# Patient Record
Sex: MALE | Race: OTHER | URBAN - METROPOLITAN AREA
[De-identification: names, ages, dates, MRNs, and addresses within clinical notes are randomized per-mention and may not be internally consistent; named-entity substitution may affect disease eponyms.]

---

## 2018-06-28 ENCOUNTER — HOSPITAL ENCOUNTER (OUTPATIENT)
Dept: LAB | Age: 43
Discharge: HOME | End: 2018-06-28

## 2018-06-28 ENCOUNTER — OFFICE VISIT (OUTPATIENT)
Dept: FAMILY MEDICINE CLINIC | Age: 43
End: 2018-06-28

## 2018-06-28 VITALS
BODY MASS INDEX: 41.82 KG/M2 | HEART RATE: 58 BPM | DIASTOLIC BLOOD PRESSURE: 68 MMHG | WEIGHT: 236 LBS | TEMPERATURE: 97.7 F | SYSTOLIC BLOOD PRESSURE: 106 MMHG | HEIGHT: 63 IN

## 2018-06-28 DIAGNOSIS — G56.03 CARPAL TUNNEL SYNDROME, BILATERAL: ICD-10-CM

## 2018-06-28 DIAGNOSIS — Z80.42 FAMILY HISTORY OF MALIGNANT NEOPLASM OF PROSTATE IN FATHER: ICD-10-CM

## 2018-06-28 DIAGNOSIS — E66.01 CLASS 3 SEVERE OBESITY DUE TO EXCESS CALORIES WITH SERIOUS COMORBIDITY AND BODY MASS INDEX (BMI) OF 40.0 TO 44.9 IN ADULT (HCC): ICD-10-CM

## 2018-06-28 DIAGNOSIS — N28.1 RENAL CYST: ICD-10-CM

## 2018-06-28 DIAGNOSIS — E66.01 CLASS 3 SEVERE OBESITY DUE TO EXCESS CALORIES WITH SERIOUS COMORBIDITY AND BODY MASS INDEX (BMI) OF 40.0 TO 44.9 IN ADULT (HCC): Primary | ICD-10-CM

## 2018-06-28 LAB
ALBUMIN SERPL-MCNC: 4.1 G/DL (ref 3.5–5)
ALBUMIN/GLOB SERPL: 1.3 {RATIO} (ref 1.1–2.2)
ALP SERPL-CCNC: 106 U/L (ref 45–117)
ALT SERPL-CCNC: 43 U/L (ref 12–78)
ANION GAP SERPL CALC-SCNC: 7 MMOL/L (ref 5–15)
AST SERPL-CCNC: 22 U/L (ref 15–37)
BILIRUB SERPL-MCNC: 0.4 MG/DL (ref 0.2–1)
BUN SERPL-MCNC: 10 MG/DL (ref 6–20)
BUN/CREAT SERPL: 12 (ref 12–20)
CALCIUM SERPL-MCNC: 9 MG/DL (ref 8.5–10.1)
CHLORIDE SERPL-SCNC: 106 MMOL/L (ref 97–108)
CHOLEST SERPL-MCNC: 234 MG/DL
CO2 SERPL-SCNC: 28 MMOL/L (ref 21–32)
CREAT SERPL-MCNC: 0.83 MG/DL (ref 0.7–1.3)
EST. AVERAGE GLUCOSE BLD GHB EST-MCNC: 117 MG/DL
GLOBULIN SER CALC-MCNC: 3.2 G/DL (ref 2–4)
GLUCOSE SERPL-MCNC: 89 MG/DL (ref 65–100)
HBA1C MFR BLD: 5.7 % (ref 4.2–6.3)
HDLC SERPL-MCNC: 38 MG/DL
HDLC SERPL: 6.2 {RATIO} (ref 0–5)
LDLC SERPL CALC-MCNC: 159 MG/DL (ref 0–100)
LIPID PROFILE,FLP: ABNORMAL
POTASSIUM SERPL-SCNC: 4.3 MMOL/L (ref 3.5–5.1)
PROT SERPL-MCNC: 7.3 G/DL (ref 6.4–8.2)
SODIUM SERPL-SCNC: 141 MMOL/L (ref 136–145)
TRIGL SERPL-MCNC: 185 MG/DL (ref ?–150)
VLDLC SERPL CALC-MCNC: 37 MG/DL

## 2018-06-28 PROCEDURE — 80053 COMPREHEN METABOLIC PANEL: CPT | Performed by: PHYSICIAN ASSISTANT

## 2018-06-28 PROCEDURE — 83036 HEMOGLOBIN GLYCOSYLATED A1C: CPT | Performed by: PHYSICIAN ASSISTANT

## 2018-06-28 PROCEDURE — 84153 ASSAY OF PSA TOTAL: CPT | Performed by: PHYSICIAN ASSISTANT

## 2018-06-28 PROCEDURE — 80061 LIPID PANEL: CPT | Performed by: PHYSICIAN ASSISTANT

## 2018-06-28 RX ORDER — NAPROXEN 500 MG/1
500 TABLET ORAL 2 TIMES DAILY WITH MEALS
Qty: 60 TAB | Refills: 0 | Status: SHIPPED | OUTPATIENT
Start: 2018-06-28

## 2018-06-28 NOTE — PROGRESS NOTES
Chief Complaint   Patient presents with    Breathing Problem     states breathing is sometimes to fast    Eye Problem

## 2018-06-28 NOTE — PATIENT INSTRUCTIONS
Síndrome del gilberto ramirez: Instrucciones de cuidado - [ Carpal Tunnel Syndrome: Care Instructions ]  Instrucciones de cuidado    El síndrome del gilberto ramirez es un problema nervioso. Puede causar hormigueo, entumecimiento, debilidad o [employer] dedos, el pulgar y la [place]. El nervio mediano y varios tejidos fibrosos, llamados tendones, atraviesan la mariah por un espacio denominado gilberto ramirez. El movimiento repetido de las krystyna al trabajar o practicar ciertos pasatiempos y deportes puede hacer presión sobre el nervio. El embarazo y ciertas afecciones médicas, terrell la diabetes, la artritis y Analy Lady tiroides hipoactiva, también pueden causar el síndrome del gilberto ramirez. Para reducir los síntomas, usted podría limitar [facility] [place] o [facility]. También puede matt otras medidas para sentirse mejor. Si los síntomas son leves, es probable que pueda aliviar el dolor con 1 o 2 semanas de tratamiento en el [facility]. La cirugía es necesaria solo si otros tratamientos no funcionan. La atención de seguimiento es diony parte clave de amezquita tratamiento y seguridad. Asegúrese de hacer y acudir a todas las citas, y llame a amezquita médico si está teniendo problemas. También es diony buena idea saber los resultados de los exámenes y mantener diony lista de los medicamentos que elzbieta. ¿Cómo puede cuidarse en el hogar? · Si es posible, interrumpa o disminuya la actividad que causa los síntomas. Si no puede suspenderla, valente pausas frecuentes para descansar y estirarse o cambie la posición de las krystyna para hacer diony tarea. Trate de [place] [facility], terrell cuando [place] el ratón de diony computadora. · Trate de evitar doblar o rotar las muñecas. · Pregúntele a amezquita médico si puede matt un analgésico (medicamento para el dolor) de venta radha, terrell acetaminofén (Tylenol), ibuprofeno (Advil, Motrin) o naproxeno (Aleve). Sea magalis con los medicamentos. Jenny y siga todas las instrucciones de la [place].   · Si amezquita médico le receta corticosteroides para ayudar a aliviar el dolor y reducir la hinchazón, tómelos exactamente terrell le fueron recetados. Llame a amezquita médico si oziel estar teniendo problemas con amezquita medicamento. · Colóquese hielo o diony compresa fría sobre la [place] de 10 a 20 minutos cada vez para aliviar el dolor. Póngase un paño pak entre el hielo y la piel. · Si amezquita médico o amezquita fisioterapeuta o terapeuta ocupacional le indica que use diony tablilla (férula) para la [place], [place] según las indicaciones para mantener la [place] en diony posición neutra. [place] también reduce la presión sobre amezquita nervio mediano. · Pregúntele a amezquita médico si debería hacer sesiones de fisioterapia o de terapia ocupacional para aprender a hacer las tareas de [employer]. · [place] clases de yoga para estirar los músculos y fortalecer las krystyna y las Gabrielle. El yoga ha [employer] síntomas del túnel james. Cómo prevenir el síndrome del túnel chloeiano  · Cuando trabaje en la computadora, Ritu Gilliam [address] y las muñecas alineadas con los antebrazos. Mantenga los codos cerca de erich costados. [place] un descanso con diony frecuencia de 10 a 15 minutos. · Trate de hacer los siguientes ejercicios:  ¨ Calentamiento: Gire la mariah hacia arrgenny, Jacoby Olivas y de un lado a otro. Repita esto 4 veces. Estire [employer] dedos, relájelos y vuelva a estirarlos. Repita 4 veces. Estire el pulgar doblándolo levemente hacia atrás, manténgalo en micah posición y relájelo. Repita 4 veces. ¨ Estiramiento con los [employer] en posición de orar: Comience colocando las daniel de las krystyna juntas susanna del pecho, elio debajo del [place]. Baje las krystyna lentamente hacia la línea de la cintura y manténgalas cerca del estómago con las daniel juntas hasta que sienta un estiramiento leve a moderado debajo de los antebrazos. Mantenga la posición entre 10 y 21 segundos. Repita 4 veces.   ¨ Estiramiento del flexor de la mariah: Extienda el Sandeep Alejandro frente a usbritt, con la antonio Lobato Morales Mihir. Doble la mariah y apunte con la mano hacia el piso. Con la [employer], doble con suavidad la mariah aún más hasta que sienta un estiramiento entre leve y moderado en el antebrazo. Mantenga la posición entre 10 y 21 segundos. Repita 4 veces. ¨ Estiramiento del extensor de la mariah: Repita los pasos para el estiramiento del flexor de la mariah supa comience con la alvarez extendida [place]. · Apriete diony pelota de goma varias veces al día para mantener rahel las krystyna y los dedos. · Evite sostener objetos (terrell un libro) en la misma posición carmelo mucho tiempo. Siempre que sea posible, utilice toda la mano para matt un objeto. Si [place] solo el pulgar y el dedo índice puede tensionar la [place]. · No fume. Puede empeorar esta afección ya que reduce el flujo de david hacia el nervio El paso. Si necesita ayuda para dejar de fumar, hable con amezquita médico sobre programas y medicamentos para dejar de fumar. Estos pueden aumentar erich probabilidades de dejar el hábito para siempre. ¿Cuándo debe pedir ayuda? Preste especial atención a los cambios de amezquita odalis y asegúrese de comunicarse con amezquita médico si:  ? · El dolor u otros problemas no mejoran con los cuidados en el hogar. ? · Desea más información sobre la fisioterapia o la terapia ocupacional.   ? · Tiene efectos secundarios producidos por los corticosteroides, tales terrell:  ¨ Aumento de [place]. ¨ Cambios en el estado de ánimo. ¨ Problemas para dormir. ¨ Fácil formación de moretones. ? · Tiene otros problemas con los medicamentos. ¿Dónde puede encontrar más información en inglés? Salma Godinez a http://niecy-london.info/. Guille Looney [id number] en la búsqueda para aprender más acerca de \"Síndrome del túnel carpiano: Instrucciones de cuidado - [ Carpal Tunnel Syndrome: Care Instructions ]. \"  Revisado: 21 marzo, 2017  Versión del contenido: 11.4  © 7174-4884 Healthwise, Incorporated.  Las instrucciones de cuidado fueron adaptadas bajo licencia por [employer] (which disclaims liability or warranty for this information). Si usted tiene Colbert Boling afección médica o sobre estas instrucciones, siempre pregunte a degroot profesional de odalis. [facility] [employer] niega toda garantía o responsabilidad por degroot uso de esta información. Síndrome del gilberto ramirez: Ejercicios - [ Carpal Tunnel Syndrome: Exercises ]  Instrucciones de cuidado  Aquí se presentan algunos ejemplos de ejercicios típicos de rehabilitación para tratar degroot afección. Empiece cada ejercicio lentamente. Reduzca la intensidad del ejercicio si Tiffani Carrier a tener dolor. Degroot médico o degroot fisioterapeuta o terapeuta ocupacional le dirá cuándo puede comenzar con estos ejercicios y cuáles funcionarán mejor para usted. Estiramientos de calentamiento  Nota: Cuando ya no tenga dolor o entumecimiento, puede hacer ejercicios para ayudar a prevenir que vuelva el síndrome del gilberto ramirez. No valente ningún estiramiento o movimiento que sea incómodo o doloroso. Estiramientos de calentamiento  · Gire la mariah hacia arriba, [place] y de un lado a otro. Repita 4 veces. · Estire los dedos separándolos. Relájelos y luego vuelva a estirarlos. Repita 4 veces. · Estire el pulgar doblándolo levemente hacia atrás, manténgalo en micah posición y relájelo. Repita 4 veces. Cómo hacer los ejercicios  Estiramiento con los brazos en posición de orar    1. Comience colocando las daniel de las krystyna juntas susanna del [place], elio debajo del [place]. 2. Baje las krystyna lentamente hacia la línea de la cintura y manténgalas cerca del estómago con las daniel juntas hasta que sienta un estiramiento leve a moderado debajo de los antebrazos. 3. Sosténgalo por lo menos de 15 a 30 segundos. Repita de 2 a 4 veces. Estiramiento del flexor de la mariah    1. Extienda el brazo susanna de usted con la alvarez [place] arriba. 2. Doble la mariah y apunte con la mano hacia el piso.   3. Con la otra mano, doble con suavidad la mariah aún más hasta que sienta un estiramiento entre leve y moderado en el [place]. 4. Sosténgalo por lo menos de 15 a 30 segundos. Repita de 2 a 4 veces. Estiramiento del extensor de la mariah    1. Repita los pasos del 1 al 4 del estiramiento anterior, supa comience con la alvarez de la mano extendida [place]. La atención de seguimiento es diony parte clave de amezquita tratamiento y seguridad. Asegúrese de hacer y acudir a todas las citas, y llame a amezquita médico si está teniendo problemas. También es diony buena idea saber los resultados de los exámenes y mantener diony lista de los medicamentos que elzbieta. ¿Dónde puede encontrar más información en inglés? Vaishnavi Neumanno a http://niecy-london.info/. Adam Ochoa [id number] en la búsqueda para aprender más acerca de \"Síndrome del túnel carpiano: Ejercicios - [ Carpal Tunnel Syndrome: Exercises ]. \"  Revisado: 21 marzo, 2017  Versión del contenido: 11.4  © 9316-3958 Healthwise, Incorporated. Las instrucciones de cuidado fueron adaptadas bajo licencia por [employer] (which disclaims liability or warranty for this information). Si [facility] tiene [place] afección médica o sobre estas instrucciones, siempre pregunte a amezquita profesional de odalis. [place] niega toda garantía o responsabilidad por amezquita uso de esta información.

## 2018-06-28 NOTE — PROGRESS NOTES
The following was performed at discharge station per provider with the assistance of , [place]:  AVS printed, reviewed with pt and given to pt. Gave pt vision resource list and reviewed this with him. Reviewed use of bilateral CTS wrist braces and also Naprosyn (cautioned pt to take with food). Explained Access Now program to pt and referred him to the registrar to make an appt to speak with Yonathan Aguilera, to start the application process for pt to see a renal specialist.  Pt will also be given an appt with the nutritionist.   Pt expressed understanding of the above information. Camille Castañeda.

## 2018-06-28 NOTE — PROGRESS NOTES
Assessment/Plan:    Diagnoses and all orders for this visit:    1. Class 3 severe obesity due to excess calories with serious comorbidity and body mass index (BMI) of 40.0 to 44.9 in adult (HCC)  -     REFERRAL TO NUTRITION  -     LIPID PANEL; Future  -     HEMOGLOBIN A1C WITH EAG; Future  -     METABOLIC PANEL, COMPREHENSIVE; Future    2. Family history of malignant neoplasm of prostate in father  -     PSA W/ REFLX FREE PSA; Future  -     REFERRAL TO UROLOGY    3. Carpal tunnel syndrome, bilateral  -     Arm Brace (WRIST BRACE MEDIUM) misc; Bilateral CTS, needs braces for each wrist. Wear nightly  -     naproxen (NAPROSYN) 500 mg tablet; Take 1 Tab by mouth two (2) times daily (with meals). 4. Renal cyst  -     REFERRAL TO UROLOGY        Follow-up Disposition:  Return in about 3 months (around 9/28/2018), or if symptoms worsen or fail to improve. TAYLOR Nichols expressed understanding of this plan. An AVS was printed and given to the patient.      ----------------------------------------------------------------------    Chief Complaint   Patient presents with    Breathing Problem     states breathing is sometimes to fast    Eye Problem       History of Present Illness:  Pt here for barbie wrist and hand numbness and pain that is worse at night. He is a . He has to lift and turn objects all day. The sxs started about 2 months ago. He has to wake up and shake his hands in the night time to get the feeling back  He started a new diet one week ago. He is aware that he is at an unhealthy weight. He is agreeable to a referral with dietician  He has noted that his vision is changing - will refer to optometry as self pay  He states that he has been followed by urology for a kidney mass/ ? Cyst that was surgically removed and he was supposed to be seen in 4/18. I don't have records that indicate this in his media section, the last one was from 2016 for kidney stone.  He is not having any current sxs of dysuria or pain with urinary system      Past Medical History:   Diagnosis Date    Allergic rhinitis, cause unspecified 3/26/2013    Esophageal reflux 3/26/2013    Family history of malignant neoplasm of prostate 3/26/2013    Heartburn 3/26/2013    Hematuria, unspecified 3/26/2013    Ulcer of lower limb, unspecified 3/26/2013       Current Outpatient Prescriptions   Medication Sig Dispense Refill    Arm Brace (WRIST BRACE MEDIUM) misc Bilateral CTS, needs braces for each wrist. Wear nightly 2 Each 0    naproxen (NAPROSYN) 500 mg tablet Take 1 Tab by mouth two (2) times daily (with meals). 61 Tab 0       No Known Allergies    Social History   Substance Use Topics    Smoking status: Never Smoker    Smokeless tobacco: Never Used    Alcohol use No       No family history on file.     Physical Exam:     Visit Vitals    /68    Pulse (!) 58    Temp 97.7 °F (36.5 °C) (Oral)    Ht 5' 3.19\" (1.605 m)    Wt 236 lb (107 kg)    BMI 41.56 kg/m2     Pleasant   A&Ox3  WDWN NAD  Respirations normal and non labored  MSK exam- barbie he has pain with phalen's and neg tinnels right but pos on left  FROM w/out pain shoulders barbie, elbows barbie

## 2018-06-30 LAB
PSA SERPL-MCNC: 0.6 NG/ML (ref 0–4)
REFLEX CRITERIA: NORMAL

## 2018-07-02 NOTE — PROGRESS NOTES
Prostate exam is normal, he does not have diabetes, his chol is improved but still elevated- he will need to follow the dietician instructions and to have this rechecked after making lifestyle changes in 6 months or sooner PRN. Please let him know these results.  Thank you

## 2018-07-12 ENCOUNTER — OFFICE VISIT (OUTPATIENT)
Dept: FAMILY MEDICINE CLINIC | Age: 43
End: 2018-07-12

## 2018-07-12 DIAGNOSIS — Z71.3 DIETARY COUNSELING AND SURVEILLANCE: Primary | ICD-10-CM

## 2018-07-12 DIAGNOSIS — Z71.89 COUNSELING AND COORDINATION OF CARE: Primary | ICD-10-CM

## 2018-07-12 NOTE — PROGRESS NOTES
Mr. Jennifer Finney was referred to RD for wt loss and high cholesterol nutrition education. No  used for this appt. Current weight 236 lbs  BMI 41.5 classifying pt as extreme obesity  Changes to diet include: decreased tortilla intake, smaller portions and increased veggies  24 hour recall performed  AM  Beans, 1/2 cup  Rice, white, 1/2 cup  Coffee  wcm  Sugar    Noon  Adrianna's salad  Chicken  Tea, sweet    Dinner  Watermelon  Rice, 1/2 cup  Water     Introduced MyPlate and discussed how it can help to ensure both balance and variety. Reviewed the food groups and what each group contributes to our bodies. Using food models, RD demonstrated appropriate portion sizes. Discussed increase of fiber rich foods with a goal of 38 grams/day.   RD gave handout on fiber rich foods  Goals: change to whole grain foods  Change to 1% milk  May download pedometer taran to track walking throughout the days/weeks

## 2018-07-13 NOTE — PROGRESS NOTES
Met with patient for Access Now Referral. Work letter pending for completion of financial screening.

## 2020-02-20 ENCOUNTER — OFFICE VISIT (OUTPATIENT)
Dept: FAMILY MEDICINE CLINIC | Age: 45
End: 2020-02-20

## 2020-02-20 ENCOUNTER — HOSPITAL ENCOUNTER (OUTPATIENT)
Dept: LAB | Age: 45
Discharge: HOME | End: 2020-02-20

## 2020-02-20 VITALS
DIASTOLIC BLOOD PRESSURE: 66 MMHG | TEMPERATURE: 98.6 F | HEIGHT: 64 IN | SYSTOLIC BLOOD PRESSURE: 110 MMHG | OXYGEN SATURATION: 98 % | WEIGHT: 234.4 LBS | HEART RATE: 59 BPM | BODY MASS INDEX: 40.02 KG/M2

## 2020-02-20 DIAGNOSIS — Z00.01 ENCOUNTER FOR GENERAL ADULT MEDICAL EXAMINATION WITH ABNORMAL FINDINGS: ICD-10-CM

## 2020-02-20 DIAGNOSIS — N50.819 TESTICULAR PAIN: ICD-10-CM

## 2020-02-20 DIAGNOSIS — Z00.01 ENCOUNTER FOR GENERAL ADULT MEDICAL EXAMINATION WITH ABNORMAL FINDINGS: Primary | ICD-10-CM

## 2020-02-20 LAB
ALBUMIN SERPL-MCNC: 4.2 G/DL (ref 3.5–5)
ALBUMIN/GLOB SERPL: 1.4 {RATIO} (ref 1.1–2.2)
ALP SERPL-CCNC: 98 U/L (ref 45–117)
ALT SERPL-CCNC: 37 U/L (ref 12–78)
ANION GAP SERPL CALC-SCNC: 5 MMOL/L (ref 5–15)
APPEARANCE UR: CLEAR
AST SERPL-CCNC: 18 U/L (ref 15–37)
BACTERIA URNS QL MICRO: NEGATIVE /HPF
BASOPHILS # BLD: 0.1 K/UL (ref 0–0.1)
BASOPHILS NFR BLD: 1 % (ref 0–1)
BILIRUB SERPL-MCNC: 0.4 MG/DL (ref 0.2–1)
BILIRUB UR QL: NEGATIVE
BUN SERPL-MCNC: 11 MG/DL (ref 6–20)
BUN/CREAT SERPL: 12 (ref 12–20)
CALCIUM SERPL-MCNC: 9.1 MG/DL (ref 8.5–10.1)
CHLORIDE SERPL-SCNC: 105 MMOL/L (ref 97–108)
CHOLEST SERPL-MCNC: 236 MG/DL
CO2 SERPL-SCNC: 29 MMOL/L (ref 21–32)
COLOR UR: ABNORMAL
CREAT SERPL-MCNC: 0.89 MG/DL (ref 0.7–1.3)
DIFFERENTIAL METHOD BLD: NORMAL
EOSINOPHIL # BLD: 0.3 K/UL (ref 0–0.4)
EOSINOPHIL NFR BLD: 4 % (ref 0–7)
EPITH CASTS URNS QL MICRO: ABNORMAL /LPF
ERYTHROCYTE [DISTWIDTH] IN BLOOD BY AUTOMATED COUNT: 13.2 % (ref 11.5–14.5)
EST. AVERAGE GLUCOSE BLD GHB EST-MCNC: 117 MG/DL
GLOBULIN SER CALC-MCNC: 3.1 G/DL (ref 2–4)
GLUCOSE POC: NORMAL MG/DL
GLUCOSE SERPL-MCNC: 97 MG/DL (ref 65–100)
GLUCOSE UR STRIP.AUTO-MCNC: NEGATIVE MG/DL
HBA1C MFR BLD: 5.7 % (ref 4–5.6)
HCT VFR BLD AUTO: 44.6 % (ref 36.6–50.3)
HDLC SERPL-MCNC: 43 MG/DL
HDLC SERPL: 5.5 {RATIO} (ref 0–5)
HGB BLD-MCNC: 14.5 G/DL (ref 12.1–17)
HGB BLD-MCNC: 16.4 G/DL
HGB UR QL STRIP: ABNORMAL
HYALINE CASTS URNS QL MICRO: ABNORMAL /LPF (ref 0–5)
IMM GRANULOCYTES # BLD AUTO: 0 K/UL (ref 0–0.04)
IMM GRANULOCYTES NFR BLD AUTO: 0 % (ref 0–0.5)
KETONES UR QL STRIP.AUTO: NEGATIVE MG/DL
LDLC SERPL CALC-MCNC: 157 MG/DL (ref 0–100)
LEUKOCYTE ESTERASE UR QL STRIP.AUTO: NEGATIVE
LIPID PROFILE,FLP: ABNORMAL
LYMPHOCYTES # BLD: 2.3 K/UL (ref 0.8–3.5)
LYMPHOCYTES NFR BLD: 33 % (ref 12–49)
MCH RBC QN AUTO: 31.3 PG (ref 26–34)
MCHC RBC AUTO-ENTMCNC: 32.5 G/DL (ref 30–36.5)
MCV RBC AUTO: 96.3 FL (ref 80–99)
MONOCYTES # BLD: 0.6 K/UL (ref 0–1)
MONOCYTES NFR BLD: 8 % (ref 5–13)
NEUTS SEG # BLD: 3.9 K/UL (ref 1.8–8)
NEUTS SEG NFR BLD: 54 % (ref 32–75)
NITRITE UR QL STRIP.AUTO: NEGATIVE
NRBC # BLD: 0 K/UL (ref 0–0.01)
NRBC BLD-RTO: 0 PER 100 WBC
PH UR STRIP: 5 [PH] (ref 5–8)
PLATELET # BLD AUTO: 380 K/UL (ref 150–400)
PMV BLD AUTO: 9.9 FL (ref 8.9–12.9)
POTASSIUM SERPL-SCNC: 4.2 MMOL/L (ref 3.5–5.1)
PROT SERPL-MCNC: 7.3 G/DL (ref 6.4–8.2)
PROT UR STRIP-MCNC: NEGATIVE MG/DL
PSA SERPL-MCNC: 0.8 NG/ML (ref 0.01–4)
RBC # BLD AUTO: 4.63 M/UL (ref 4.1–5.7)
RBC #/AREA URNS HPF: ABNORMAL /HPF (ref 0–5)
SODIUM SERPL-SCNC: 139 MMOL/L (ref 136–145)
SP GR UR REFRACTOMETRY: 1.02 (ref 1–1.03)
TRIGL SERPL-MCNC: 180 MG/DL (ref ?–150)
UROBILINOGEN UR QL STRIP.AUTO: 0.2 EU/DL (ref 0.2–1)
VLDLC SERPL CALC-MCNC: 36 MG/DL
WBC # BLD AUTO: 7.1 K/UL (ref 4.1–11.1)
WBC URNS QL MICRO: ABNORMAL /HPF (ref 0–4)

## 2020-02-20 PROCEDURE — 85025 COMPLETE CBC W/AUTO DIFF WBC: CPT

## 2020-02-20 PROCEDURE — 83036 HEMOGLOBIN GLYCOSYLATED A1C: CPT

## 2020-02-20 PROCEDURE — 80053 COMPREHEN METABOLIC PANEL: CPT

## 2020-02-20 PROCEDURE — 87491 CHLMYD TRACH DNA AMP PROBE: CPT

## 2020-02-20 PROCEDURE — 84153 ASSAY OF PSA TOTAL: CPT

## 2020-02-20 PROCEDURE — 80061 LIPID PANEL: CPT

## 2020-02-20 PROCEDURE — 86592 SYPHILIS TEST NON-TREP QUAL: CPT

## 2020-02-20 PROCEDURE — 81001 URINALYSIS AUTO W/SCOPE: CPT

## 2020-02-20 PROCEDURE — 87389 HIV-1 AG W/HIV-1&-2 AB AG IA: CPT

## 2020-02-20 RX ORDER — CIPROFLOXACIN 500 MG/1
500 TABLET ORAL 2 TIMES DAILY
Qty: 28 TAB | Refills: 0 | Status: SHIPPED | OUTPATIENT
Start: 2020-02-20 | End: 2020-03-05

## 2020-02-20 NOTE — PROGRESS NOTES
Printed AVS, provided to patient and reviewed. Ordered medication reviewed with pt and coupon given. Stressed the importance of finishing entire course of antibiotic and informed of common side effects. Reviewed lab work that was drawn today and explained that results will be mailed to pt. If there is a result of concern, someone from the [facility] office will call pt. Also sent him to registration to make a nurse visit appointment for 2 weeks to receive HIV and Chlamydia results. All questions answered and pt verbalized understanding. Hallie Albright interpreted for this encounter.  Julee Pacheco RN

## 2020-02-20 NOTE — PROGRESS NOTES
HISTORY OF PRESENT ILLNESS  Pinky Enamorado is a 40 y.o. male. HPI  Patient states he would like to have labs. His father had prostate cancer and would like to have PSA. Also states he had a problem with kidney stones. He was refer to urology and the stones were treated. Also states when he has intercourse, he has testicular pain, this has been going on for maybe 4 months. Review of Systems   Constitutional: Negative for chills, diaphoresis, fever, malaise/fatigue and weight loss. HENT: Negative for congestion, ear discharge, ear pain, hearing loss, nosebleeds and tinnitus. Eyes: Negative for blurred vision, double vision, photophobia, pain and discharge. Respiratory: Negative for cough, hemoptysis, sputum production and shortness of breath. Cardiovascular: Negative for chest pain, palpitations, orthopnea and claudication. Gastrointestinal: Negative for abdominal pain, heartburn, nausea and vomiting. Genitourinary:        Testicular pain   Skin: Negative for itching and rash. Neurological: Negative for dizziness, tingling, tremors and headaches. /66 (BP 1 Location: Left arm, BP Patient Position: Sitting)   Pulse (!) 59   Temp 98.6 °F (37 °C) (Oral)   Ht 5' 4.37\" (1.635 m)   Wt 234 lb 6.4 oz (106.3 kg)   SpO2 98%   BMI 39.77 kg/m²   Physical Exam  Constitutional:       General: He is not in acute distress. HENT:      Head: Normocephalic. Right Ear: Tympanic membrane normal. There is no impacted cerumen. Left Ear: Tympanic membrane normal.      Nose: Nose normal. No congestion. Cardiovascular:      Rate and Rhythm: Normal rate. Pulses: Normal pulses. Heart sounds: Normal heart sounds. No murmur. Pulmonary:      Effort: Pulmonary effort is normal. No respiratory distress. Breath sounds: Normal breath sounds. No wheezing or rales. Abdominal:      General: Bowel sounds are normal. There is no distension. Palpations: Abdomen is soft. Tenderness: There is no abdominal tenderness. There is no guarding. Genitourinary:     Penis: Normal.       Scrotum/Testes: Normal.   Musculoskeletal: Normal range of motion. General: No swelling or deformity. Skin:     General: Skin is warm. Coloration: Skin is not jaundiced. Findings: No bruising. Neurological:      Mental Status: He is alert. ASSESSMENT and PLAN  Diagnoses and all orders for this visit:    1. Encounter for general adult medical examination with abnormal findings  -     AMB POC GLUCOSE BLOOD, BY GLUCOSE MONITORING DEVICE  -     AMB POC HEMOGLOBIN (HGB)  -     LIPID PANEL; Future  -     METABOLIC PANEL, COMPREHENSIVE; Future  -     CBC WITH AUTOMATED DIFF; Future  -     HEMOGLOBIN A1C WITH EAG; Future  -     PSA, DIAGNOSTIC (PROSTATE SPECIFIC AG); Future  -     RPR; Future  -     CHLAMYDIA/GC PCR; Future  -     HIV 1/2 AG/AB, 4TH GENERATION,W RFLX CONFIRM; Future    2. Testicular pain  -     ciprofloxacin HCl (CIPRO) 500 mg tablet; Take 1 Tab by mouth two (2) times a day for 14 days.  -     URINALYSIS W/ RFLX MICROSCOPIC;  Future      We will check labs today  Suspect epididymitis,  We will cover with cipro

## 2020-02-20 NOTE — PROGRESS NOTES
Coordination of Care  1. Have you been to the ER, urgent care clinic since your last visit? Hospitalized since your last visit? No    2. Have you seen or consulted any other health care providers outside of the [address] [place] [address] since your last visit? Include any pap smears or colon screening. No    Does the patient need refills? NO    Learning Assessment Complete?  yes  Depression Screening complete in the past 12 months? yes     Results for orders placed or performed in visit on 02/20/20   AMB POC GLUCOSE BLOOD, BY GLUCOSE MONITORING DEVICE   Result Value Ref Range    Glucose POC F 100 mg/dL   AMB POC HEMOGLOBIN (HGB)   Result Value Ref Range    Hemoglobin (POC) 16.4

## 2020-02-21 LAB
C TRACH DNA SPEC QL NAA+PROBE: NEGATIVE
HIV 1+2 AB+HIV1 P24 AG SERPL QL IA: NONREACTIVE
HIV12 RESULT COMMENT, HHIVC: NORMAL
N GONORRHOEA DNA SPEC QL NAA+PROBE: NEGATIVE
RPR SER QL: NONREACTIVE
SAMPLE TYPE: NORMAL
SERVICE CMNT-IMP: NORMAL
SPECIMEN SOURCE: NORMAL

## 2020-02-24 NOTE — PROGRESS NOTES
Cholesterol and hemoglobin a1c with similar results as last test,  With at risk of diabetes and elevated cholesterol  Normal blood count, normal prostate test in blood normal liver function,kidney funciton and electrolytes  hiv test negative, syphilis test negative, gonorrhea and chlamydia test negative  Patient can be informed

## 2020-02-25 NOTE — PROGRESS NOTES
RN called pt with the assistance of [employer]  # [id number]. RN reviewed all information in Dr. Ramsey Ramsay result note, including the information regarding STI test results. Pt stated that he has not had unprotected sexual relations, no diagnosed STD or hepatitis, not injected street drugs, nor been ill with an undiagnosed fever during the last 6 months. RN explained that HIV sometimes takes up to 6 months to be revealed in lab results, therefore a recommendation is made for a second test if the above behaviors have occurred during the last 6 months. Pt stated that his wife had sexual relations with another person, and he asked if she should be tested. RN advised that it would be beneficial for her to be tested also. RN discussed recommendations for lifestyle changes for lowering Hemoglobin A1C and cholesterol numbers. Pt has information he was given by the dietician previously, and he agreed to review this and to start exercising. He would like to return to have his  levels checked again. RN advised him to return in 4-6 months. RN advised pt that he does not have to come to the appt on 3/5/20 as that appt was to discuss lab results unless he has other questions. He stated he will call the appt line in 4-6 months.   Herminia Andrew RN

## 2020-10-15 ENCOUNTER — VIRTUAL VISIT (OUTPATIENT)
Dept: FAMILY MEDICINE CLINIC | Age: 45
End: 2020-10-15

## 2020-10-15 DIAGNOSIS — K21.9 GASTROESOPHAGEAL REFLUX DISEASE, UNSPECIFIED WHETHER ESOPHAGITIS PRESENT: Primary | ICD-10-CM

## 2020-10-15 PROCEDURE — 99213 OFFICE O/P EST LOW 20 MIN: CPT | Performed by: FAMILY MEDICINE

## 2020-10-15 RX ORDER — PANTOPRAZOLE SODIUM 40 MG/1
40 TABLET, DELAYED RELEASE ORAL DAILY
Qty: 30 TAB | Refills: 2 | Status: SHIPPED | OUTPATIENT
Start: 2020-10-15

## 2020-10-15 NOTE — PROGRESS NOTES
HISTORY OF PRESENT ILLNESS  Chinyere Koenig is a 39 y.o. male. HPI  I was at home while conducting this encounter. Consent:  He and/or his healthcare decision maker is aware that this patient-initiated Telehealth encounter is a billable service, with coverage as determined by his insurance carrier. He is aware that he may receive a bill and has provided verbal consent to proceed: Yes    This virtual visit was conducted telephone encounter only. -  I affirm this is a Patient Initiated Episode with an Established Patient who has not had a related appointment within my department in the past 7 days or scheduled within the next 24 hours. Note: this encounter is not billable if this call serves to triage the patient into an appointment for the relevant concern. Total Time: minutes: 11-20 minutes. Patient states he started having epigastric pain,  He felt that way for about 1 day, then started vomiting twice. Then he took some pepto bismol. The pain has improve some but he keeps having acid reflux all the way to his throat. Denies any blood in stool. He is passing gases fine. Patient states he doesn't drink. States he ate a greasy and spicy food the day the symptoms started. ROS    Physical Exam    ASSESSMENT and PLAN  Diagnoses and all orders for this visit:    1. Gastroesophageal reflux disease, unspecified whether esophagitis present  -     pantoprazole (PROTONIX) 40 mg tablet; Take 1 Tab by mouth daily. We went over dietary changes, avoid spicy foods, avoid greasy food and acidic foods such as lemon, oranges, grapefruit.   If intense abdominal pain develops, patient will need to go to the ED

## 2020-10-15 NOTE — PROGRESS NOTES
Coordination of Care  1. Have you been to the ER, urgent care clinic since your last visit? Hospitalized since your last visit? No    2. Have you seen or consulted any other health care providers outside of the [address] [place] [address] since your last visit? Include any pap smears or colon screening. No    Does the patient need refills? NO    Learning Assessment Complete? yes  Depression Screening complete in the past 12 months? yes     I used Hunter Hutson with [facility]  services for this call.  Clay Hdz RN

## 2022-03-19 PROBLEM — E66.01 OBESITY, MORBID (HCC): Status: ACTIVE | Noted: 2018-06-28

## 2023-07-05 ENCOUNTER — OFFICE VISIT (OUTPATIENT)
Age: 48
End: 2023-07-05

## 2023-07-05 ENCOUNTER — HOSPITAL ENCOUNTER (OUTPATIENT)
Facility: HOSPITAL | Age: 48
Setting detail: SPECIMEN
Discharge: HOME | End: 2023-07-08

## 2023-07-05 VITALS
HEART RATE: 64 BPM | TEMPERATURE: 98 F | SYSTOLIC BLOOD PRESSURE: 109 MMHG | DIASTOLIC BLOOD PRESSURE: 69 MMHG | WEIGHT: 258 LBS | OXYGEN SATURATION: 95 % | BODY MASS INDEX: 43.51 KG/M2

## 2023-07-05 DIAGNOSIS — R21 RASH: Primary | ICD-10-CM

## 2023-07-05 DIAGNOSIS — Z80.42 FAMILY HISTORY OF MALIGNANT NEOPLASM OF PROSTATE: ICD-10-CM

## 2023-07-05 DIAGNOSIS — E66.01 OBESITY, MORBID (HCC): ICD-10-CM

## 2023-07-05 DIAGNOSIS — E78.5 HYPERLIPIDEMIA, UNSPECIFIED HYPERLIPIDEMIA TYPE: ICD-10-CM

## 2023-07-05 PROCEDURE — 83036 HEMOGLOBIN GLYCOSYLATED A1C: CPT

## 2023-07-05 PROCEDURE — 36415 COLL VENOUS BLD VENIPUNCTURE: CPT

## 2023-07-05 PROCEDURE — 86803 HEPATITIS C AB TEST: CPT

## 2023-07-05 PROCEDURE — 80061 LIPID PANEL: CPT

## 2023-07-05 PROCEDURE — 84443 ASSAY THYROID STIM HORMONE: CPT

## 2023-07-05 PROCEDURE — 80053 COMPREHEN METABOLIC PANEL: CPT

## 2023-07-05 PROCEDURE — 85025 COMPLETE CBC W/AUTO DIFF WBC: CPT

## 2023-07-05 PROCEDURE — 99213 OFFICE O/P EST LOW 20 MIN: CPT | Performed by: FAMILY MEDICINE

## 2023-07-05 PROCEDURE — G0103 PSA SCREENING: HCPCS

## 2023-07-05 PROCEDURE — 87389 HIV-1 AG W/HIV-1&-2 AB AG IA: CPT

## 2023-07-05 ASSESSMENT — PATIENT HEALTH QUESTIONNAIRE - PHQ9
SUM OF ALL RESPONSES TO PHQ QUESTIONS 1-9: 0
SUM OF ALL RESPONSES TO PHQ QUESTIONS 1-9: 0
SUM OF ALL RESPONSES TO PHQ9 QUESTIONS 1 & 2: 0
1. LITTLE INTEREST OR PLEASURE IN DOING THINGS: 0
SUM OF ALL RESPONSES TO PHQ QUESTIONS 1-9: 0
SUM OF ALL RESPONSES TO PHQ QUESTIONS 1-9: 0
2. FEELING DOWN, DEPRESSED OR HOPELESS: 0

## 2023-07-05 ASSESSMENT — ENCOUNTER SYMPTOMS
SHORTNESS OF BREATH: 0
CONSTIPATION: 0
ABDOMINAL PAIN: 0
WHEEZING: 0
COUGH: 0
VOMITING: 0
NAUSEA: 0
DIARRHEA: 0

## 2023-07-05 NOTE — PROGRESS NOTES
Patient name and date of birth verified. Patient given an after visit summary. Informed that labs are ordered to be completed today and that follow up care as needed. Time given for questions, patient had no questions at time of visit. Jesica Bales LPN    Patient given FIT kit with instructions given by Nancy Cade CMA.   Jesica Bales LPN

## 2023-07-05 NOTE — PROGRESS NOTES
Coordination of Care  1. Have you been to the ER, urgent care clinic since your last visit? Hospitalized since your last visit? no    2. Have you seen or consulted any other health care providers outside of the [address] [place] [address] since your last visit? Include any pap smears or colon screening. no    Does the patient need refills? no    Learning Assessment Complete?  yes  Depression Screening complete in the past 12 months? yes

## 2023-07-06 LAB
ALBUMIN SERPL-MCNC: 3.6 G/DL (ref 3.5–5)
ALBUMIN/GLOB SERPL: 1.1 (ref 1.1–2.2)
ALP SERPL-CCNC: 84 U/L (ref 45–117)
ALT SERPL-CCNC: 44 U/L (ref 12–78)
ANION GAP SERPL CALC-SCNC: 3 MMOL/L (ref 5–15)
AST SERPL-CCNC: 20 U/L (ref 15–37)
BASOPHILS # BLD: 0 K/UL (ref 0–0.1)
BASOPHILS NFR BLD: 1 % (ref 0–1)
BILIRUB SERPL-MCNC: 0.4 MG/DL (ref 0.2–1)
BUN SERPL-MCNC: 12 MG/DL (ref 6–20)
BUN/CREAT SERPL: 15 (ref 12–20)
CALCIUM SERPL-MCNC: 8.9 MG/DL (ref 8.5–10.1)
CHLORIDE SERPL-SCNC: 108 MMOL/L (ref 97–108)
CHOLEST SERPL-MCNC: 228 MG/DL
CO2 SERPL-SCNC: 27 MMOL/L (ref 21–32)
CREAT SERPL-MCNC: 0.82 MG/DL (ref 0.7–1.3)
DIFFERENTIAL METHOD BLD: NORMAL
EOSINOPHIL # BLD: 0.2 K/UL (ref 0–0.4)
EOSINOPHIL NFR BLD: 3 % (ref 0–7)
ERYTHROCYTE [DISTWIDTH] IN BLOOD BY AUTOMATED COUNT: 13.5 % (ref 11.5–14.5)
EST. AVERAGE GLUCOSE BLD GHB EST-MCNC: 111 MG/DL
GLOBULIN SER CALC-MCNC: 3.2 G/DL (ref 2–4)
GLUCOSE SERPL-MCNC: 110 MG/DL (ref 65–100)
HBA1C MFR BLD: 5.5 % (ref 4–5.6)
HCT VFR BLD AUTO: 43.7 % (ref 36.6–50.3)
HCV AB SERPL QL IA: NONREACTIVE
HDLC SERPL-MCNC: 40 MG/DL
HDLC SERPL: 5.7 (ref 0–5)
HGB BLD-MCNC: 14.1 G/DL (ref 12.1–17)
HIV 1+2 AB+HIV1 P24 AG SERPL QL IA: NONREACTIVE
HIV 1/2 RESULT COMMENT: NORMAL
IMM GRANULOCYTES # BLD AUTO: 0 K/UL (ref 0–0.04)
IMM GRANULOCYTES NFR BLD AUTO: 0 % (ref 0–0.5)
LDLC SERPL CALC-MCNC: 141.6 MG/DL (ref 0–100)
LYMPHOCYTES # BLD: 1.9 K/UL (ref 0.8–3.5)
LYMPHOCYTES NFR BLD: 35 % (ref 12–49)
MCH RBC QN AUTO: 30.8 PG (ref 26–34)
MCHC RBC AUTO-ENTMCNC: 32.3 G/DL (ref 30–36.5)
MCV RBC AUTO: 95.4 FL (ref 80–99)
MONOCYTES # BLD: 0.5 K/UL (ref 0–1)
MONOCYTES NFR BLD: 9 % (ref 5–13)
NEUTS SEG # BLD: 2.9 K/UL (ref 1.8–8)
NEUTS SEG NFR BLD: 52 % (ref 32–75)
NRBC # BLD: 0 K/UL (ref 0–0.01)
NRBC BLD-RTO: 0 PER 100 WBC
PLATELET # BLD AUTO: 384 K/UL (ref 150–400)
PMV BLD AUTO: 10 FL (ref 8.9–12.9)
POTASSIUM SERPL-SCNC: 4.2 MMOL/L (ref 3.5–5.1)
PROT SERPL-MCNC: 6.8 G/DL (ref 6.4–8.2)
PSA SERPL-MCNC: 0.9 NG/ML (ref 0.01–4)
RBC # BLD AUTO: 4.58 M/UL (ref 4.1–5.7)
SODIUM SERPL-SCNC: 138 MMOL/L (ref 136–145)
TRIGL SERPL-MCNC: 232 MG/DL
TSH SERPL DL<=0.05 MIU/L-ACNC: 1.13 UIU/ML (ref 0.36–3.74)
VLDLC SERPL CALC-MCNC: 46.4 MG/DL
WBC # BLD AUTO: 5.5 K/UL (ref 4.1–11.1)

## 2023-07-12 ENCOUNTER — NURSE ONLY (OUTPATIENT)
Age: 48
End: 2023-07-12

## 2023-07-12 ENCOUNTER — HOSPITAL ENCOUNTER (OUTPATIENT)
Facility: HOSPITAL | Age: 48
Setting detail: SPECIMEN
Discharge: HOME | End: 2023-07-15

## 2023-07-12 PROCEDURE — 36415 COLL VENOUS BLD VENIPUNCTURE: CPT

## 2023-07-12 PROCEDURE — 82274 ASSAY TEST FOR BLOOD FECAL: CPT

## 2023-07-14 LAB — HEMOCCULT STL QL IA: NEGATIVE

## 2023-07-17 ENCOUNTER — TELEPHONE (OUTPATIENT)
Age: 48
End: 2023-07-17

## 2023-07-17 NOTE — TELEPHONE ENCOUNTER
TC call made to patient to review latest lab results. Name and  verified. Pt was reminded to follow healthy diet and increase physical activity. Pt verbalized understanding.  Natalia Wolfe RN      ----- Message from Rupert Barnett MD sent at 2023  8:35 AM EDT -----  Mildly elevated cholesterol levels, rest of labs normal   Healthy diet and regular physical activity recommended  Patient can be informed

## 2024-03-19 ENCOUNTER — HOSPITAL ENCOUNTER (OUTPATIENT)
Facility: HOSPITAL | Age: 49
Discharge: HOME | End: 2024-03-22

## 2024-03-19 DIAGNOSIS — M79.641 RIGHT HAND PAIN: ICD-10-CM

## 2024-03-19 PROCEDURE — 73130 X-RAY EXAM OF HAND: CPT

## 2025-07-19 ENCOUNTER — TRANSCRIBE ORDERS (OUTPATIENT)
Facility: HOSPITAL | Age: 50
End: 2025-07-19

## 2025-07-19 DIAGNOSIS — Z87.442 HISTORY OF KIDNEY STONES: Primary | ICD-10-CM

## 2025-08-09 ENCOUNTER — HOSPITAL ENCOUNTER (OUTPATIENT)
Facility: HOSPITAL | Age: 50
Discharge: HOME | End: 2025-08-12

## 2025-08-09 DIAGNOSIS — Z87.442 HISTORY OF KIDNEY STONES: ICD-10-CM

## 2025-08-09 PROCEDURE — 74176 CT ABD & PELVIS W/O CONTRAST: CPT
